# Patient Record
Sex: FEMALE | URBAN - METROPOLITAN AREA
[De-identification: names, ages, dates, MRNs, and addresses within clinical notes are randomized per-mention and may not be internally consistent; named-entity substitution may affect disease eponyms.]

---

## 2019-05-07 ENCOUNTER — APPOINTMENT (OUTPATIENT)
Dept: URBAN - METROPOLITAN AREA CLINIC 319 | Age: 54
Setting detail: DERMATOLOGY
End: 2019-05-07

## 2019-05-07 DIAGNOSIS — L81.0 POSTINFLAMMATORY HYPERPIGMENTATION: ICD-10-CM

## 2019-05-07 PROCEDURE — OTHER PHOTO-DOCUMENTATION: OTHER

## 2019-05-07 PROCEDURE — OTHER TREATMENT REGIMEN: OTHER

## 2019-05-07 PROCEDURE — OTHER OTHER: OTHER

## 2019-05-07 PROCEDURE — OTHER PRESCRIPTION: OTHER

## 2019-05-07 RX ORDER — HYDROQUINONE 4 %
CREAM (GRAM) TOPICAL
Qty: 1 | Refills: 2 | Status: ERX | OUTPATIENT
Start: 2019-05-07

## 2019-05-07 RX ADMIN — HYDROQUINONE: 4 % CREAM (GRAM) TOPICAL at 00:00:00

## 2019-05-07 ASSESSMENT — LOCATION DETAILED DESCRIPTION DERM
LOCATION DETAILED: LEFT CENTRAL MALAR CHEEK
LOCATION DETAILED: LEFT CENTRAL MALAR CHEEK
LOCATION DETAILED: RIGHT CENTRAL MALAR CHEEK
LOCATION DETAILED: LEFT INFERIOR CENTRAL MALAR CHEEK
LOCATION DETAILED: RIGHT CENTRAL MALAR CHEEK

## 2019-05-07 ASSESSMENT — LOCATION SIMPLE DESCRIPTION DERM
LOCATION SIMPLE: LEFT CHEEK
LOCATION SIMPLE: RIGHT CHEEK
LOCATION SIMPLE: LEFT CHEEK
LOCATION SIMPLE: RIGHT CHEEK

## 2019-05-07 ASSESSMENT — LOCATION ZONE DERM
LOCATION ZONE: FACE
LOCATION ZONE: FACE

## 2019-05-07 NOTE — PROCEDURE: TREATMENT REGIMEN
Continue Regimen: Hydroquinone 8% cream no Retin a apply twice a day to dark spots on face \\nBetamethasone ointment twice a day \\nElta sunscreen
Plan: Will do a test shot of photo facial one zap in 1 week with Nilam don’t charge for photo facial if it works. \\nWe can try hydroquinone 8% make sure it doesn’t make her Retin a
Detail Level: Detailed
Discontinue Regimen: Hydroquinone 4%

## 2019-05-07 NOTE — PROCEDURE: OTHER
Note Text (......Xxx Chief Complaint.): This diagnosis correlates with the
Detail Level: Detailed
Other (Free Text): No charge today

## 2019-07-24 ENCOUNTER — APPOINTMENT (OUTPATIENT)
Dept: URBAN - METROPOLITAN AREA CLINIC 320 | Age: 54
Setting detail: DERMATOLOGY
End: 2019-09-26

## 2019-07-24 DIAGNOSIS — Z41.9 ENCOUNTER FOR PROCEDURE FOR PURPOSES OTHER THAN REMEDYING HEALTH STATE, UNSPECIFIED: ICD-10-CM

## 2019-07-24 PROCEDURE — OTHER REASSURANCE: OTHER

## 2019-07-24 NOTE — PROCEDURE: REASSURANCE
Detail Level: Detailed
Additional Notes (Optional): Patient is instructed to stop Rhofade and start applying Mirvaso topical gel 0.33% twice a day after applying sunscreen.\\nPatient verbalized understanding.
Hide Additional Notes?: No

## 2019-07-24 NOTE — HPI: COSMETIC FOLLOW UP
What Condition Are We Treating?: Patient is experiencing redness, flushing and feels she is darker post treatment to a Full face Fraxel on 03/05/2019. Follow up after patient was placed on Rhofade 0.1% on 06/11/2019.
What Procedure Did We Perform At The Last Visit?: Fraxel

## 2019-07-30 ENCOUNTER — APPOINTMENT (OUTPATIENT)
Dept: URBAN - METROPOLITAN AREA CLINIC 320 | Age: 54
Setting detail: DERMATOLOGY
End: 2019-08-13

## 2019-07-30 DIAGNOSIS — Z41.9 ENCOUNTER FOR PROCEDURE FOR PURPOSES OTHER THAN REMEDYING HEALTH STATE, UNSPECIFIED: ICD-10-CM

## 2019-07-30 PROCEDURE — OTHER LUMENIS M22: OTHER

## 2019-07-30 ASSESSMENT — LOCATION DETAILED DESCRIPTION DERM
LOCATION DETAILED: RIGHT INFERIOR CENTRAL MALAR CHEEK
LOCATION DETAILED: LEFT MEDIAL MALAR CHEEK
LOCATION DETAILED: LEFT INFERIOR CENTRAL MALAR CHEEK
LOCATION DETAILED: LEFT CENTRAL MALAR CHEEK
LOCATION DETAILED: RIGHT LATERAL MALAR CHEEK

## 2019-07-30 ASSESSMENT — LOCATION SIMPLE DESCRIPTION DERM
LOCATION SIMPLE: RIGHT CHEEK
LOCATION SIMPLE: LEFT CHEEK

## 2019-07-30 ASSESSMENT — LOCATION ZONE DERM: LOCATION ZONE: FACE

## 2019-07-30 NOTE — PROCEDURE: LUMENIS M22
External Cooling Fan Speed: 0
Skin Type (Optional): II
External Cooling: Balaji Cryo 5
Fluence: 13
Treatment Number: 1
Number Of Passes: 2
Topical Anesthesia?: BLT cream (benzocaine 20%, lidocaine 6%, tetracaine 4%)
Total Pulses: 15
Treated Area: small area
Fluence Units: J/cm2
Post-Care Instructions: I reviewed with the patient in detail post-care instructions. Patient should stay away from the sun and wear sun protection until treated areas are fully healed.
Length Of Topical Anesthesia Application (Optional): 20 minutes
Pulse Duration (In Milliseconds): 3
Pulse Delay (In Milliseconds): 5
Procedure Text: The patient's skin was cleaned and the procedure was performed using the parameters noted above.
Consent: Written consent obtained, risks reviewed including but not limited to crusting, scabbing, blistering, scarring, darker or lighter pigmentary change, bruising, and/or incomplete response.
Detail Level: Zone

## 2019-07-30 NOTE — HPI: COSMETIC FOLLOW UP
What Condition Are We Treating?: Follow up patient placed on topicals to help with redness.
What Procedure Did We Perform At The Last Visit?: Laser

## 2019-08-23 ENCOUNTER — APPOINTMENT (OUTPATIENT)
Dept: URBAN - METROPOLITAN AREA CLINIC 320 | Age: 54
Setting detail: DERMATOLOGY
End: 2023-03-01

## 2019-08-23 DIAGNOSIS — Z41.9 ENCOUNTER FOR PROCEDURE FOR PURPOSES OTHER THAN REMEDYING HEALTH STATE, UNSPECIFIED: ICD-10-CM

## 2019-08-23 PROCEDURE — OTHER REASSURANCE: OTHER

## 2019-08-23 NOTE — PROCEDURE: REASSURANCE
Hide Additional Notes?: No
Additional Notes (Optional): Per Dr Guerra oral antibiotics changed. Patient is to stop Oradea and star with Doxycycline 120 mg take half a tablet twice a day. Apply a thick coat of Rhofade cream to face twice daily. In the pm wait 10 minutes then apply Solantra cream.
Detail Level: Detailed

## 2019-10-23 ENCOUNTER — APPOINTMENT (OUTPATIENT)
Dept: URBAN - METROPOLITAN AREA CLINIC 320 | Age: 54
Setting detail: DERMATOLOGY
End: 2023-03-01

## 2019-10-23 DIAGNOSIS — Z41.9 ENCOUNTER FOR PROCEDURE FOR PURPOSES OTHER THAN REMEDYING HEALTH STATE, UNSPECIFIED: ICD-10-CM

## 2019-10-23 PROCEDURE — OTHER REASSURANCE: OTHER

## 2019-10-23 NOTE — PROCEDURE: REASSURANCE
Hide Additional Notes?: No
Additional Notes (Optional): I took post treatment photos. We both compared the the before and the today photos. Patient continues to say she did not have the dark pigmentation.\\n\\nI asked the patient what does she want at this point. Again she states she wants treatment or products that will lighten her skin, where she does not need to wear makeup.\\n\\nI explained to patient lasers are not an option because of the post symptoms she experienced. I did recommend ZO  topicals . \\n\\nPatient does not want something that is not going to be permanent.\\n\\nI explained to patient nothing is permanent because of her skin type. Most important is to wear sunscreen spf 30 or higher and to reapply at least twice a day.\\n\\nPatient verbalized understanding.
Detail Level: Detailed

## 2019-10-23 NOTE — HPI: COSMETIC FOLLOW UP
What Condition Are We Treating?: Patient comes in for 2 month follow up . Patient states she has not used any prescription products on her face for 2 weeks now. Patient states she continued to be concerned about a dark pigmentation that she did not have prior to fraxel.
What Procedure Did We Perform At The Last Visit?: Reassurance